# Patient Record
Sex: FEMALE | URBAN - METROPOLITAN AREA
[De-identification: names, ages, dates, MRNs, and addresses within clinical notes are randomized per-mention and may not be internally consistent; named-entity substitution may affect disease eponyms.]

---

## 2019-02-07 ENCOUNTER — APPOINTMENT (RX ONLY)
Dept: URBAN - METROPOLITAN AREA CLINIC 51 | Facility: CLINIC | Age: 29
Setting detail: DERMATOLOGY
End: 2019-02-07

## 2019-02-07 DIAGNOSIS — D485 NEOPLASM OF UNCERTAIN BEHAVIOR OF SKIN: ICD-10-CM

## 2019-02-07 DIAGNOSIS — L30.9 DERMATITIS, UNSPECIFIED: ICD-10-CM

## 2019-02-07 PROBLEM — J30.1 ALLERGIC RHINITIS DUE TO POLLEN: Status: ACTIVE | Noted: 2019-02-07

## 2019-02-07 PROBLEM — D23.9 OTHER BENIGN NEOPLASM OF SKIN, UNSPECIFIED: Status: ACTIVE | Noted: 2019-02-07

## 2019-02-07 PROBLEM — L85.3 XEROSIS CUTIS: Status: ACTIVE | Noted: 2019-02-07

## 2019-02-07 PROBLEM — L20.84 INTRINSIC (ALLERGIC) ECZEMA: Status: ACTIVE | Noted: 2019-02-07

## 2019-02-07 PROBLEM — E13.9 OTHER SPECIFIED DIABETES MELLITUS WITHOUT COMPLICATIONS: Status: ACTIVE | Noted: 2019-02-07

## 2019-02-07 PROBLEM — D48.5 NEOPLASM OF UNCERTAIN BEHAVIOR OF SKIN: Status: ACTIVE | Noted: 2019-02-07

## 2019-02-07 PROBLEM — L70.0 ACNE VULGARIS: Status: ACTIVE | Noted: 2019-02-07

## 2019-02-07 PROCEDURE — ? DEFER

## 2019-02-07 PROCEDURE — 99203 OFFICE O/P NEW LOW 30 MIN: CPT

## 2019-02-07 PROCEDURE — ? COUNSELING

## 2019-02-07 PROCEDURE — ? PRESCRIPTION

## 2019-02-07 RX ORDER — BETAMETHASONE VALERATE 1 MG/G
1 OINTMENT TOPICAL BID
Qty: 1 | Refills: 0 | Status: ERX | COMMUNITY
Start: 2019-02-07

## 2019-02-07 RX ORDER — HYDROXYZINE HYDROCHLORIDE 25 MG/1
1 TABLET, FILM COATED ORAL QHS
Qty: 30 | Refills: 0 | Status: ERX | COMMUNITY
Start: 2019-02-07

## 2019-02-07 RX ADMIN — HYDROXYZINE HYDROCHLORIDE 1: 25 TABLET, FILM COATED ORAL at 16:46

## 2019-02-07 RX ADMIN — BETAMETHASONE VALERATE 1: 1 OINTMENT TOPICAL at 16:48

## 2019-02-07 NOTE — PROCEDURE: MIPS QUALITY
Quality 431: Preventive Care And Screening: Unhealthy Alcohol Use - Screening: Patient screened for unhealthy alcohol use using a single question and scores less than 2 times per year
Quality 226: Preventive Care And Screening: Tobacco Use: Screening And Cessation Intervention: Tobacco Screening not Performed for Medical Reasons
Quality 130: Documentation Of Current Medications In The Medical Record: Current Medications Documented
Quality 110: Preventive Care And Screening: Influenza Immunization: Influenza immunization was not ordered or administered, reason not given
Detail Level: Detailed

## 2019-02-15 ENCOUNTER — APPOINTMENT (RX ONLY)
Dept: URBAN - METROPOLITAN AREA CLINIC 51 | Facility: CLINIC | Age: 29
Setting detail: DERMATOLOGY
End: 2019-02-15

## 2019-02-15 DIAGNOSIS — D485 NEOPLASM OF UNCERTAIN BEHAVIOR OF SKIN: ICD-10-CM

## 2019-02-15 DIAGNOSIS — L30.9 DERMATITIS, UNSPECIFIED: ICD-10-CM

## 2019-02-15 PROBLEM — D48.5 NEOPLASM OF UNCERTAIN BEHAVIOR OF SKIN: Status: ACTIVE | Noted: 2019-02-15

## 2019-02-15 PROCEDURE — 99213 OFFICE O/P EST LOW 20 MIN: CPT | Mod: 25

## 2019-02-15 PROCEDURE — ? ORDER TESTS

## 2019-02-15 PROCEDURE — 11104 PUNCH BX SKIN SINGLE LESION: CPT

## 2019-02-15 PROCEDURE — ? COUNSELING

## 2019-02-15 PROCEDURE — ? BIOPSY BY PUNCH METHOD

## 2019-02-15 PROCEDURE — 11105 PUNCH BX SKIN EA SEP/ADDL: CPT

## 2019-02-15 PROCEDURE — ? TREATMENT REGIMEN

## 2019-02-15 ASSESSMENT — LOCATION SIMPLE DESCRIPTION DERM: LOCATION SIMPLE: RIGHT PLANTAR SURFACE

## 2019-02-15 ASSESSMENT — LOCATION ZONE DERM: LOCATION ZONE: FEET

## 2019-02-15 ASSESSMENT — LOCATION DETAILED DESCRIPTION DERM
LOCATION DETAILED: RIGHT PLANTAR FOREFOOT OVERLYING 4TH METATARSAL
LOCATION DETAILED: RIGHT LATERAL PLANTAR MIDFOOT

## 2019-02-15 NOTE — PROCEDURE: TREATMENT REGIMEN
Plan: Follow up with infectious disease and may possibly need to do prednisone taper when Bx results come back in
Detail Level: Zone

## 2019-02-15 NOTE — PROCEDURE: BIOPSY BY PUNCH METHOD
Size Of Lesion In Cm (Optional): 0
Notification Instructions: Patient will be notified of biopsy results. However, patient instructed to call the office if not contacted within 2 weeks.
Punch Size In Mm: 3
Render Post-Care Instructions In Note?: no
Anesthesia Volume In Cc (Will Not Render If 0): 1.5
Path Notes (To The Dermatopathologist): R/o: Bullous impetigo vs moccasin disorder \\n3.0 mm
Suture Removal: 14 days
Wound Care: Bacitracin
Body Location Override (Optional - Billing Will Still Be Based On Selected Body Map Location If Applicable): right medial plantar foot
Billing Type: [employer]
Was A Bandage Applied: Yes
Epidermal Sutures: 4-0 Nylon
Hemostasis: None
Post-Care Instructions: I reviewed with the patient in detail post-care instructions. Patient is to keep the biopsy site dry overnight, and then apply bacitracin twice daily until healed. Patient may apply hydrogen peroxide soaks to remove any crusting.
Anesthesia Type: 1% lidocaine with epinephrine
Detail Level: Detailed
Dressing: bandage
Lab: [id number]
Home Suture Removal Text: Patient was provided a home suture removal kit and will remove their sutures at home. If they have any questions or difficulties they will call the office.
Biopsy Type: DIF
Consent: Written consent was obtained and risks were reviewed including but not limited to scarring, infection, bleeding, scabbing, incomplete removal, nerve damage and allergy to anesthesia.
Billing Type: Third-Party Bill
Body Location Override (Optional - Billing Will Still Be Based On Selected Body Map Location If Applicable): right lateral plantar foot
Lab: [id number]
Home Suture Removal Text: Patient was provided a home suture removal kit and will remove their sutures at home.  If they have any questions or difficulties they will call the office.

## 2019-03-01 ENCOUNTER — APPOINTMENT (RX ONLY)
Dept: URBAN - METROPOLITAN AREA CLINIC 51 | Facility: CLINIC | Age: 29
Setting detail: DERMATOLOGY
End: 2019-03-01

## 2019-03-01 DIAGNOSIS — L12.0 BULLOUS PEMPHIGOID: ICD-10-CM

## 2019-03-01 DIAGNOSIS — D22 MELANOCYTIC NEVI: ICD-10-CM

## 2019-03-01 PROBLEM — D22.9 MELANOCYTIC NEVI, UNSPECIFIED: Status: ACTIVE | Noted: 2019-03-01

## 2019-03-01 PROCEDURE — ? PRESCRIPTION

## 2019-03-01 PROCEDURE — 99213 OFFICE O/P EST LOW 20 MIN: CPT

## 2019-03-01 PROCEDURE — ? NOTED ON EXAM BUT NOT TREATED

## 2019-03-01 PROCEDURE — ? TREATMENT REGIMEN

## 2019-03-01 PROCEDURE — ? COUNSELING

## 2019-03-01 RX ORDER — PREDNISONE 2.5 MG/1
1 TABLET ORAL QD
Qty: 3 | Refills: 0 | Status: ERX | COMMUNITY
Start: 2019-03-01

## 2019-03-01 RX ORDER — PREDNISONE 50 MG/1
1 TABLET ORAL QD
Qty: 7 | Refills: 0 | Status: ERX | COMMUNITY
Start: 2019-03-01

## 2019-03-01 RX ORDER — PREDNISONE 10 MG/1
1 TABLET ORAL QD
Qty: 5 | Refills: 0 | Status: ERX | COMMUNITY
Start: 2019-03-01

## 2019-03-01 RX ORDER — PREDNISONE 20 MG/1
1 TABLET ORAL QD
Qty: 6 | Refills: 0 | Status: ERX | COMMUNITY
Start: 2019-03-01

## 2019-03-01 RX ORDER — PREDNISONE 5 MG/1
1 TABLET ORAL QD
Qty: 4 | Refills: 0 | Status: ERX | COMMUNITY
Start: 2019-03-01

## 2019-03-01 RX ORDER — PREDNISONE 1 MG/1
1 TABLET ORAL QD
Qty: 3 | Refills: 0 | Status: ERX | COMMUNITY
Start: 2019-03-01

## 2019-03-01 RX ORDER — DOXYCYCLINE HYCLATE 100 MG/1
1 TABLET, COATED ORAL QD
Qty: 30 | Refills: 0 | Status: ERX | COMMUNITY
Start: 2019-03-01

## 2019-03-01 RX ORDER — BETAMETHASONE DIPROPIONATE 0.5 MG/G
1 OINTMENT TOPICAL BID
Qty: 1 | Refills: 0 | Status: ERX | COMMUNITY
Start: 2019-03-01

## 2019-03-01 RX ADMIN — PREDNISONE 1: 20 TABLET ORAL at 19:00

## 2019-03-01 RX ADMIN — PREDNISONE 1: 1 TABLET ORAL at 19:12

## 2019-03-01 RX ADMIN — PREDNISONE 1: 10 TABLET ORAL at 19:08

## 2019-03-01 RX ADMIN — PREDNISONE 1: 50 TABLET ORAL at 18:57

## 2019-03-01 RX ADMIN — PREDNISONE 1: 2.5 TABLET ORAL at 19:11

## 2019-03-01 RX ADMIN — DOXYCYCLINE HYCLATE 1: 100 TABLET, COATED ORAL at 18:54

## 2019-03-01 RX ADMIN — PREDNISONE 1: 5 TABLET ORAL at 19:10

## 2019-03-01 RX ADMIN — BETAMETHASONE DIPROPIONATE 1: 0.5 OINTMENT TOPICAL at 19:15

## 2019-03-01 NOTE — PROCEDURE: MIPS QUALITY
Quality 226: Preventive Care And Screening: Tobacco Use: Screening And Cessation Intervention: Tobacco Screening not Performed for Medical Reasons
Quality 130: Documentation Of Current Medications In The Medical Record: Current Medications Documented
Quality 110: Preventive Care And Screening: Influenza Immunization: Influenza immunization was not ordered or administered, reason not given
Detail Level: Detailed

## 2019-03-01 NOTE — PROCEDURE: TREATMENT REGIMEN
Plan: F/u with infectious disease, podiatry, and dermatology in [address] BX lab results still pending
Detail Level: Zone

## 2019-04-02 ENCOUNTER — APPOINTMENT (RX ONLY)
Dept: URBAN - METROPOLITAN AREA CLINIC 51 | Facility: CLINIC | Age: 29
Setting detail: DERMATOLOGY
End: 2019-04-02

## 2019-04-02 DIAGNOSIS — L12.0 BULLOUS PEMPHIGOID: ICD-10-CM

## 2019-04-02 DIAGNOSIS — L70.8 OTHER ACNE: ICD-10-CM

## 2019-04-02 DIAGNOSIS — D22 MELANOCYTIC NEVI: ICD-10-CM

## 2019-04-02 PROBLEM — D22.9 MELANOCYTIC NEVI, UNSPECIFIED: Status: ACTIVE | Noted: 2019-04-02

## 2019-04-02 PROCEDURE — 99213 OFFICE O/P EST LOW 20 MIN: CPT

## 2019-04-02 PROCEDURE — ? NOTED ON EXAM BUT NOT TREATED

## 2019-04-02 PROCEDURE — ? TREATMENT REGIMEN

## 2019-04-02 PROCEDURE — ? PRESCRIPTION

## 2019-04-02 PROCEDURE — ? COUNSELING

## 2019-04-02 RX ORDER — PREDNISONE 20 MG/1
TABLET ORAL PRN
Qty: 30 | Refills: 0 | Status: ERX

## 2019-04-02 RX ORDER — BETAMETHASONE DIPROPIONATE 0.5 MG/G
OINTMENT TOPICAL BID
Qty: 1 | Refills: 0 | Status: ERX

## 2019-04-02 NOTE — PROCEDURE: TREATMENT REGIMEN
Detail Level: Zone
Initiate Treatment: Retinol \\nGlycolic pads
Plan: Will talk about aldactone when not flaring with Bullous pemphigoid

## 2019-04-02 NOTE — PROCEDURE: MIPS QUALITY
Quality 431: Preventive Care And Screening: Unhealthy Alcohol Use - Screening: Patient screened for unhealthy alcohol use using a single question and scores less than 2 times per year
Detail Level: Detailed
Quality 226: Preventive Care And Screening: Tobacco Use: Screening And Cessation Intervention: Tobacco Screening not Performed for Medical Reasons
Quality 130: Documentation Of Current Medications In The Medical Record: Current Medications Documented
Quality 110: Preventive Care And Screening: Influenza Immunization: Influenza immunization was not ordered or administered, reason not given

## 2019-04-05 ENCOUNTER — APPOINTMENT (RX ONLY)
Dept: URBAN - METROPOLITAN AREA CLINIC 51 | Facility: CLINIC | Age: 29
Setting detail: DERMATOLOGY
End: 2019-04-05

## 2019-04-05 DIAGNOSIS — L12.0 BULLOUS PEMPHIGOID: ICD-10-CM

## 2019-04-05 PROCEDURE — ? TREATMENT REGIMEN

## 2019-04-05 PROCEDURE — 99213 OFFICE O/P EST LOW 20 MIN: CPT

## 2019-04-05 PROCEDURE — ? COUNSELING

## 2019-04-05 PROCEDURE — ? PRESCRIPTION

## 2019-04-05 RX ORDER — PREDNISONE 10 MG/1
TABLET ORAL QD
Qty: 3 | Refills: 0 | Status: ERX

## 2019-04-05 RX ORDER — PREDNISONE 2.5 MG/1
TABLET ORAL QD
Qty: 1 | Refills: 0 | Status: ERX

## 2019-04-05 RX ORDER — PREDNISONE 50 MG/1
TABLET ORAL QD
Qty: 5 | Refills: 0 | Status: ERX

## 2019-04-05 RX ORDER — PREDNISONE 20 MG/1
TABLET ORAL QD
Qty: 4 | Refills: 0 | Status: ERX

## 2019-04-05 NOTE — PROCEDURE: MIPS QUALITY
Detail Level: Detailed
Quality 226: Preventive Care And Screening: Tobacco Use: Screening And Cessation Intervention: Tobacco Screening not Performed for Medical Reasons
Quality 130: Documentation Of Current Medications In The Medical Record: Current Medications Documented
Quality 110: Preventive Care And Screening: Influenza Immunization: Influenza immunization was not ordered or administered, reason not given

## 2019-04-05 NOTE — PROCEDURE: TREATMENT REGIMEN
Plan: Start taking after 60mg of Prednisone given from [facility]. Refer to [facility] derm for further care.
Detail Level: Zone

## 2019-04-06 RX ORDER — HYDROXYZINE HYDROCHLORIDE 25 MG/1
TABLET, FILM COATED ORAL QHS
Qty: 30 | Refills: 0 | Status: ERX